# Patient Record
Sex: FEMALE | Race: BLACK OR AFRICAN AMERICAN | URBAN - METROPOLITAN AREA
[De-identification: names, ages, dates, MRNs, and addresses within clinical notes are randomized per-mention and may not be internally consistent; named-entity substitution may affect disease eponyms.]

---

## 2023-08-08 ENCOUNTER — OFFICE VISIT (OUTPATIENT)
Dept: OBGYN CLINIC | Facility: CLINIC | Age: 23
End: 2023-08-08

## 2023-08-08 VITALS — SYSTOLIC BLOOD PRESSURE: 132 MMHG | HEART RATE: 75 BPM | DIASTOLIC BLOOD PRESSURE: 90 MMHG | WEIGHT: 184 LBS

## 2023-08-08 DIAGNOSIS — N92.6 MISSED MENSES: Primary | ICD-10-CM

## 2023-08-08 PROCEDURE — 99202 OFFICE O/P NEW SF 15 MIN: CPT | Performed by: OBSTETRICS & GYNECOLOGY

## 2023-08-08 PROCEDURE — 3075F SYST BP GE 130 - 139MM HG: CPT | Performed by: OBSTETRICS & GYNECOLOGY

## 2023-08-08 PROCEDURE — 3080F DIAST BP >= 90 MM HG: CPT | Performed by: OBSTETRICS & GYNECOLOGY

## 2023-08-08 RX ORDER — MEDROXYPROGESTERONE ACETATE 10 MG/1
10 TABLET ORAL DAILY
Qty: 10 TABLET | Refills: 0 | Status: SHIPPED | OUTPATIENT
Start: 2023-08-08 | End: 2023-08-18

## 2023-08-08 NOTE — PROGRESS NOTES
Helen Morrison is a 21year old female [facility] Patient's last menstrual period was 05/15/2023. Patient presents with:  Gyn Problem: IRREGULAR MENSES  Presenting for evaluation of irregular menses. Irregular period started at the end of July. Previously regular menses every 28 days followed by no menses in . She reports that she had spotting that started  and continuing currently. Urine HCG in office today negative. [place] has been a big life stressor. OBSTETRICS HISTORY:  OB History     T0    L0    SAB0  IAB0  Ectopic0  Multiple0  Live Births0     GYNE HISTORY:  Patient's last menstrual period was 05/15/2023. Sexual activity:   Not on file        Follow Up Recommendation: LPS DONE ABOUT 2 YEARS NORMAL PER PT      MEDICAL HISTORY:  History reviewed. No pertinent past medical history. SURGICAL HISTORY:  History reviewed. No pertinent surgical history.     SOCIAL HISTORY:  Social History    Socioeconomic History      Marital status: Single      Spouse name: Not on file      Number of children: Not on file      Years of education: Not on file      Highest education level: Not on file    Occupational History      Not on file    Tobacco Use      Smoking status: Not on file      Smokeless tobacco: Not on file    Substance and Sexual Activity      Alcohol use: Not on file      Drug use: Not on file      Sexual activity: Not on file    Other Topics      Concerns:        Not on file    Social History Narrative      Not on file    Social Determinants of Health  Financial Resource Strain: Not on file  Food Insecurity: Not on file  Transportation Needs: Not on file  Physical Activity: Not on file  Stress: Not on file  Social Connections: Not on file  Housing Stability: Not on file      Depression Screening (PHQ-2/PHQ-9): Over the LAST 2 WEEKS   Little interest or pleasure in doing things: Not at all    Feeling down, depressed, or hopeless: Not at all    PHQ-2 SCORE: 0           MEDICATIONS:    Current Outpatient Medications:     medroxyPROGESTERone Acetate (PROVERA) 10 MG Oral Tab, Take 1 tablet (10 mg total) by mouth daily for 10 days. , Disp: 10 tablet, Rfl: 0    ALLERGIES:  Not on File      Review of Systems:  Review of Systems   All other systems reviewed and are negative. 08/08/23  1407   BP: 132/90   Pulse: 75       PHYSICAL EXAM:   Constitutional: well developed, well nourished  Head/Face: normocephalic  Skin/Hair: no unusual rashes or bruises  Extremities: no edema, no cyanosis  Psychiatric:  Oriented to time, place, person and situation. Appropriate mood and affect      Assessment & Plan:  Shaji Pittman was seen today for gyn problem. Diagnoses and all orders for this visit:    Missed menses    Other orders  -     medroxyPROGESTERone Acetate (PROVERA) 10 MG Oral Tab; Take 1 tablet (10 mg total) by mouth daily for 10 days. Requested Prescriptions     Signed Prescriptions Disp Refills    medroxyPROGESTERone Acetate (PROVERA) 10 MG Oral Tab 10 tablet 0     Sig: Take 1 tablet (10 mg total) by mouth daily for 10 days. Progesterone course sent. RTC in 3-4 months for annual and follow-up on menses.

## 2023-11-09 ENCOUNTER — OFFICE VISIT (OUTPATIENT)
Dept: OBGYN CLINIC | Facility: CLINIC | Age: 23
End: 2023-11-09
Payer: COMMERCIAL

## 2023-11-09 VITALS — SYSTOLIC BLOOD PRESSURE: 122 MMHG | WEIGHT: 179 LBS | HEART RATE: 91 BPM | DIASTOLIC BLOOD PRESSURE: 74 MMHG

## 2023-11-09 DIAGNOSIS — Z01.419 WOMEN'S ANNUAL ROUTINE GYNECOLOGICAL EXAMINATION: Primary | ICD-10-CM

## 2023-11-09 PROCEDURE — 3074F SYST BP LT 130 MM HG: CPT | Performed by: OBSTETRICS & GYNECOLOGY

## 2023-11-09 PROCEDURE — 3078F DIAST BP <80 MM HG: CPT | Performed by: OBSTETRICS & GYNECOLOGY

## 2023-11-09 PROCEDURE — 99395 PREV VISIT EST AGE 18-39: CPT | Performed by: OBSTETRICS & GYNECOLOGY

## 2023-11-09 NOTE — PROGRESS NOTES
Grant Sol is a 21year old female [facility] Patient's last menstrual period was 10/06/2023 (exact date). Chief Complaint   Patient presents with    Gyn Exam     ANNUAL, [address] ON BOTH BREASTS   Presenting for well woman exam. Unknown if she has had a pap smear. Monthly menses with heavy flow changing a pad every 1-2 hours. Not interested in [facility] at this time since hoping to get pregnant. Has started having bilateral breast pain for the past 2 months that is sharp and at random times. OBSTETRICS HISTORY:  OB History    Para Term  AB Living   0 0 0 0 0 0   SAB IAB Ectopic Multiple Live Births   0 0 0 0 0       GYNE HISTORY:  Patient's last menstrual period was 10/06/2023 (exact date). History   Sexual Activity    Sexual activity: Yes        Follow Up Recommendation: LPS DONE ABOUT 2 YEARS NORMAL PER PT      MEDICAL HISTORY:  History reviewed. No pertinent past medical history. SURGICAL HISTORY:  History reviewed. No pertinent surgical history.     SOCIAL HISTORY:  Social History     Socioeconomic History    Marital status: Single     Spouse name: Not on file    Number of children: Not on file    Years of education: Not on file    Highest education level: Not on file   Occupational History    Not on file   Tobacco Use    Smoking status: Unknown    Smokeless tobacco: Not on file   Substance and Sexual Activity    Alcohol use: Not on file    Drug use: Not on file    Sexual activity: Yes   Other Topics Concern    Not on file   Social History Narrative    Not on file     Social Determinants of Health     Financial Resource Strain: Not on file   Food Insecurity: Not on file   Transportation Needs: Not on file   Physical Activity: Not on file   Stress: Not on file   Social Connections: Not on file   Housing Stability: Not on file         Depression Screening (PHQ-2/PHQ-9): Over the LAST 2 WEEKS   Little interest or pleasure in doing things (over the last two weeks)?: Not at all    Feeling down, depressed, or hopeless (over the last two weeks)?: Not at all    PHQ-2 SCORE: 0           MEDICATIONS:  No current outpatient medications on file. ALLERGIES:  Not on File      Review of Systems:  Review of Systems   All other systems reviewed and are negative. Vitals:    11/09/23 1117   BP: 122/74   Pulse: 91       PHYSICAL EXAM:   Physical Exam  Vitals reviewed. Constitutional:       Appearance: Normal appearance. HENT:      Head: Atraumatic. Eyes:      Pupils: Pupils are equal, round, and reactive to light. Pulmonary:      Effort: Pulmonary effort is normal.   Chest:   Breasts:     Right: Normal. No bleeding, inverted nipple, mass, nipple discharge, skin change or tenderness. Left: Normal. No bleeding, inverted nipple, mass, nipple discharge, skin change or tenderness. Abdominal:      General: Abdomen is flat. Palpations: Abdomen is soft. Tenderness: There is no abdominal tenderness. Genitourinary:     General: Normal vulva. Exam position: Lithotomy position. Labia:         Right: No rash, tenderness, lesion or injury. Left: No rash, tenderness, lesion or injury. Vagina: Normal.      Cervix: Normal.      Uterus: Normal. Not tender. Adnexa: Right adnexa normal and left adnexa normal.        Right: No tenderness or fullness. Left: No tenderness or fullness. Lymphadenopathy:      Upper Body:      Right upper body: No supraclavicular, axillary or pectoral adenopathy. Left upper body: No supraclavicular, axillary or pectoral adenopathy. Skin:     General: Skin is warm and dry. Neurological:      General: No focal deficit present. Mental Status: She is alert and oriented to person, place, and time. Psychiatric:         Mood and Affect: Mood normal.         Behavior: Behavior normal.         Thought Content:  Thought content normal.         Judgment: Judgment normal.           Assessment & Plan:  Bria Scott was seen today for gyn exam.    Diagnoses and all orders for this visit:    Women's annual routine gynecological examination  -     ThinPrep PAP with HPV Reflex Request B        Requested Prescriptions      No prescriptions requested or ordered in this encounter       Pap smear collected. Encourage SBE. Recommend exams yearly. Start prenatal vitamins. Reviewed ovulation timing.

## 2024-05-17 ENCOUNTER — TELEPHONE (OUTPATIENT)
Dept: OBGYN CLINIC | Facility: CLINIC | Age: 24
End: 2024-05-17

## 2024-05-17 NOTE — TELEPHONE ENCOUNTER
Patient went to [facility] location this morning instead of [place] and asking if a no-show fee will be charged.    Patient knew she wouldn't have time to get to [place], but was hoping the fee would be waived.    Pls advise

## 2024-05-17 NOTE — TELEPHONE ENCOUNTER
Informed patient of below. Appointment cancelled so patient will not receive no show fee. Patient verbalized understanding.

## 2024-06-13 ENCOUNTER — OFFICE VISIT (OUTPATIENT)
Dept: OBGYN CLINIC | Facility: CLINIC | Age: 24
End: 2024-06-13

## 2024-06-13 VITALS — DIASTOLIC BLOOD PRESSURE: 72 MMHG | HEART RATE: 102 BPM | WEIGHT: 189.19 LBS | SYSTOLIC BLOOD PRESSURE: 109 MMHG

## 2024-06-13 DIAGNOSIS — L70.0 ACNE VULGARIS: ICD-10-CM

## 2024-06-13 DIAGNOSIS — N92.6 IRREGULAR MENSTRUAL CYCLE: Primary | ICD-10-CM

## 2024-06-13 PROCEDURE — 99395 PREV VISIT EST AGE 18-39: CPT | Performed by: NURSE PRACTITIONER

## 2024-06-13 PROCEDURE — 3074F SYST BP LT 130 MM HG: CPT | Performed by: NURSE PRACTITIONER

## 2024-06-13 PROCEDURE — 3078F DIAST BP <80 MM HG: CPT | Performed by: NURSE PRACTITIONER

## 2024-06-13 NOTE — PROGRESS NOTES
[facility]   Obstetrics and Gynecology    Travis Rodarte is a 24 year old female  Patient's last menstrual period was 2024 (exact date).   Chief Complaint   Patient presents with    Gyn Problem     IRREGULAR CYCLES, TRYING TO GET PREGNANT   .   She has been trying to conceive since a year ago.  No period in August, took medication in August and then periods were monthly since then. Menses will last 6-7 days. Using period tracker.    Normal flow, no pain with periods. No intermenstrual bleeding.  She had 2 periods in May -  and then again on .    She is using ovulation kits and states got a +ovulation.    Her partner has no medical issues.    Patient was told when lived in [facility] she had PCOS, didn't have a period for 3 months. She took metformin at that time and her periods not coming regularly.    +acne face and shoulders    Menarche age 12. Had heavy periods in college and was on birth control.    Family hx of fibroids    Pap:2023 NILM  Contraception: none    OBSTETRICS HISTORY:  OB History    Para Term  AB Living   0 0 0 0 0 0   SAB IAB Ectopic Multiple Live Births   0 0 0 0 0       GYNE HISTORY:  Menarche: 9 YEARS OLD (2024  2:26 PM)  Period Cycle (Days): IRREGULAR (2024  2:26 PM)  Period Duration (Days): 7 DAYS (2024  2:26 PM)  Period Flow: HEAVY TO LIGHT (2024  2:26 PM)  Use of Birth Control (if yes, specify type): None (2024  2:26 PM)  Pap Date: 23 (2024  2:26 PM)  Pap Result Notes: NORMAL (2024  2:26 PM)  Follow Up Recommendation: LPS DONE ABOUT 2 YEARS NORMAL PER PT (2023 11:14 AM)      History   Sexual Activity    Sexual activity: Yes       MEDICAL HISTORY:  History reviewed. No pertinent past medical history.    SOCIAL HISTORY:  Social History     Socioeconomic History    Marital status: Single     Spouse name: Not on file    Number of children: Not on file    Years of education: Not on file    Highest education  level: Not on file   Occupational History    Not on file   Tobacco Use    Smoking status: Unknown    Smokeless tobacco: Not on file   Substance and Sexual Activity    Alcohol use: Not on file    Drug use: Not on file    Sexual activity: Yes   Other Topics Concern    Not on file   Social History Narrative    Not on file     Social Determinants of Health     Financial Resource Strain: Not on file   Food Insecurity: Not on file   Transportation Needs: Not on file   Physical Activity: Not on file   Stress: Not on file   Social Connections: Not on file   Housing Stability: Not on file       MEDICATIONS:  No current outpatient medications on file.    ALLERGIES:  Not on File      Review of Systems:  Constitutional:  Denies fatigue, night sweats, hot flashes  Cardiovascular:  denies chest pain or palpitations  Respiratory:  denies shortness of breath  Gastrointestinal:  denies heartburn, abdominal pain, diarrhea or constipation  Genitourinary:  denies dysuria, incontinence, abnormal vaginal discharge, vaginal itching +irregular periods  Musculoskeletal:  denies back pain.  Skin/Breast:  Denies any breast pain, lumps, or discharge.   Neurological:  denies headaches, extremity weakness or numbness.  Psychiatric: denies depression or anxiety.  Endocrine:   denies excessive thirst or urination. +acne  Heme/Lymph:  denies history of anemia, easy bruising or bleeding.      PHYSICAL EXAM:     Vitals:    06/13/24 1424   BP: 109/72   Pulse: 102   Weight: 189 lb 3.2 oz (85.8 kg)     There is no height or weight on file to calculate BMI.     Constitutional: well developed, well nourished    Psychiatric:  Oriented to time, place, person and situation. Appropriate mood and affect    Assessment & Plan:  Travis was seen today for gyn problem.    Diagnoses and all orders for this visit:    Irregular menstrual cycle  -     FSH; Future  -     LH (Luteinizing Hormone); Future  -     Prolactin; Future  -     Assay, Thyroid Stim Hormone;  Future  -     US PELVIS W EV (CPT=76856/37451); Future  -     HCG, Beta Subunit (Quant Pregnancy Test); Future  -     Dehydroepiandrosterone Sulfate; Future  -     Testosterone,Total and Weakly Bound w/ SHBG; Future  -     TSH W Reflex To Free T4  -     Hemoglobin A1C; Future  -     Estradiol; Future    Acne vulgaris  -     FSH; Future  -     LH (Luteinizing Hormone); Future  -     Prolactin; Future  -     Assay, Thyroid Stim Hormone; Future  -     HCG, Beta Subunit (Quant Pregnancy Test); Future  -     Dehydroepiandrosterone Sulfate; Future  -     Testosterone,Total and Weakly Bound w/ SHBG; Future  -     TSH W Reflex To Free T4  -     Hemoglobin A1C; Future    Plan day 3 labs  Continue tracking cycles  Pelvic ultrasound ordered due to family hx of fibroids  Follow up with Dr. Cervantes for consult after labs and US      SERJIO Hoff    This note was prepared using Dragon Medical voice recognition dictation software. As a result errors may occur. When identified these errors have been corrected. While every attempt is made to correct errors during dictation discrepancies may still exist.

## 2024-06-29 ENCOUNTER — LAB ENCOUNTER (OUTPATIENT)
Dept: LAB | Facility: HOSPITAL | Age: 24
End: 2024-06-29
Attending: NURSE PRACTITIONER
Payer: COMMERCIAL

## 2024-06-29 DIAGNOSIS — L70.0 ACNE VULGARIS: ICD-10-CM

## 2024-06-29 DIAGNOSIS — N92.6 IRREGULAR MENSTRUAL CYCLE: ICD-10-CM

## 2024-06-29 LAB
B-HCG SERPL-ACNC: <2.6 MIU/ML
DHEA-S SERPL-MCNC: 315.2 UG/DL
EST. AVERAGE GLUCOSE BLD GHB EST-MCNC: 105 MG/DL (ref 68–126)
ESTRADIOL SERPL-MCNC: 66.3 PG/ML
FSH SERPL-ACNC: 6.5 MIU/ML
HBA1C MFR BLD: 5.3 % (ref ?–5.7)
LH SERPL-ACNC: 10.6 MIU/ML
PROLACTIN SERPL-MCNC: 9 NG/ML
TSI SER-ACNC: 1.81 MIU/ML (ref 0.55–4.78)

## 2024-06-29 PROCEDURE — 83001 ASSAY OF GONADOTROPIN (FSH): CPT

## 2024-06-29 PROCEDURE — 83002 ASSAY OF GONADOTROPIN (LH): CPT

## 2024-06-29 PROCEDURE — 84410 TESTOSTERONE BIOAVAILABLE: CPT

## 2024-06-29 PROCEDURE — 83036 HEMOGLOBIN GLYCOSYLATED A1C: CPT

## 2024-06-29 PROCEDURE — 84702 CHORIONIC GONADOTROPIN TEST: CPT

## 2024-06-29 PROCEDURE — 84146 ASSAY OF PROLACTIN: CPT

## 2024-06-29 PROCEDURE — 36415 COLL VENOUS BLD VENIPUNCTURE: CPT

## 2024-06-29 PROCEDURE — 82670 ASSAY OF TOTAL ESTRADIOL: CPT

## 2024-06-29 PROCEDURE — 82627 DEHYDROEPIANDROSTERONE: CPT

## 2024-06-29 PROCEDURE — 84443 ASSAY THYROID STIM HORMONE: CPT | Performed by: NURSE PRACTITIONER

## 2024-07-03 ENCOUNTER — HOSPITAL ENCOUNTER (OUTPATIENT)
Dept: ULTRASOUND IMAGING | Facility: HOSPITAL | Age: 24
Discharge: HOME | End: 2024-07-03
Attending: NURSE PRACTITIONER
Payer: COMMERCIAL

## 2024-07-03 DIAGNOSIS — N92.6 IRREGULAR MENSTRUAL CYCLE: ICD-10-CM

## 2024-07-03 PROCEDURE — 76856 US EXAM PELVIC COMPLETE: CPT | Performed by: NURSE PRACTITIONER

## 2024-07-03 PROCEDURE — 76830 TRANSVAGINAL US NON-OB: CPT | Performed by: NURSE PRACTITIONER

## 2024-07-04 LAB
SEX HORM BIND GLOB: 55.5 NMOL/L
TESTOST % FREE+WEAK BND: 12.7 %
TESTOST FREE+WEAK BND: 9.9 NG/DL
TESTOSTERONE TOT /MS: 77.7 NG/DL

## 2024-07-05 PROBLEM — E28.2 PCOS (POLYCYSTIC OVARIAN SYNDROME): Status: ACTIVE | Noted: 2024-07-05

## 2024-07-31 ENCOUNTER — OFFICE VISIT (OUTPATIENT)
Dept: OBGYN CLINIC | Facility: CLINIC | Age: 24
End: 2024-07-31
Payer: COMMERCIAL

## 2024-07-31 VITALS — HEART RATE: 85 BPM | WEIGHT: 193.19 LBS | DIASTOLIC BLOOD PRESSURE: 82 MMHG | SYSTOLIC BLOOD PRESSURE: 125 MMHG

## 2024-07-31 DIAGNOSIS — E28.2 PCOS (POLYCYSTIC OVARIAN SYNDROME): Primary | ICD-10-CM

## 2024-07-31 PROCEDURE — 3079F DIAST BP 80-89 MM HG: CPT | Performed by: OBSTETRICS & GYNECOLOGY

## 2024-07-31 PROCEDURE — 99213 OFFICE O/P EST LOW 20 MIN: CPT | Performed by: OBSTETRICS & GYNECOLOGY

## 2024-07-31 PROCEDURE — 3074F SYST BP LT 130 MM HG: CPT | Performed by: OBSTETRICS & GYNECOLOGY

## 2024-07-31 RX ORDER — METFORMIN HYDROCHLORIDE 500 MG/1
1500 TABLET, EXTENDED RELEASE ORAL
Qty: 270 TABLET | Refills: 2 | Status: SHIPPED | OUTPATIENT
Start: 2024-07-31

## 2024-07-31 NOTE — PROGRESS NOTES
Travis Rodarte is a 24 year old female  Patient's last menstrual period was 2024 (exact date).   Chief Complaint   Patient presents with    Consult     Discuss lab results per patient    Presenting to discuss infertility. She has been trying to conceive since 2023. Recent labs giving diagnosis of PCOS. We discussed starting Metformin for PCOS. She states that her partner had a normal semen analysis in the past few months. She has a history of irregular menses, but menses since use of Provera in 2023 have been regular.     OBSTETRICS HISTORY:  OB History    Para Term  AB Living   0 0 0 0 0 0   SAB IAB Ectopic Multiple Live Births   0 0 0 0 0       GYNE HISTORY:  Patient's last menstrual period was 2024 (exact date).    History   Sexual Activity    Sexual activity: Yes        Pap Date: 23  Pap Result Notes: Neg Pap // Annual 23 N      MEDICAL HISTORY:  History reviewed. No pertinent past medical history.      SURGICAL HISTORY:  History reviewed. No pertinent surgical history.    SOCIAL HISTORY:  Social History     Socioeconomic History    Marital status: Single     Spouse name: Not on file    Number of children: Not on file    Years of education: Not on file    Highest education level: Not on file   Occupational History    Not on file   Tobacco Use    Smoking status: Unknown    Smokeless tobacco: Not on file   Substance and Sexual Activity    Alcohol use: Not on file    Drug use: Not on file    Sexual activity: Yes   Other Topics Concern    Not on file   Social History Narrative    Not on file     Social Determinants of Health     Financial Resource Strain: Not on file   Food Insecurity: Not on file   Transportation Needs: Not on file   Physical Activity: Not on file   Stress: Not on file   Social Connections: Not on file   Housing Stability: Not on file         Depression Screening (PHQ-2/PHQ-9): Over the LAST 2 WEEKS   Little interest or pleasure in doing things  (over the last two weeks)?: Not at all    Feeling down, depressed, or hopeless (over the last two weeks)?: Not at all    PHQ-2 SCORE: 0           MEDICATIONS:    Current Outpatient Medications:     metFORMIN  MG Oral Tablet 24 Hr, Take 3 tablets (1,500 mg total) by mouth daily with breakfast., Disp: 270 tablet, Rfl: 2    ALLERGIES:  Not on File      Review of Systems:  Review of Systems   All other systems reviewed and are negative.       Vitals:    07/31/24 1405   BP: 125/82   Pulse: 85       PHYSICAL EXAM:   Constitutional: well developed, well nourished  Head/Face: normocephalic  Skin/Hair: no unusual rashes or bruises  Extremities: no edema, no cyanosis  Psychiatric:  Oriented to time, place, person and situation. Appropriate mood and affect      Assessment & Plan:  Travis was seen today for consult.    Diagnoses and all orders for this visit:    PCOS (polycystic ovarian syndrome)  -     Progesterone; Future    Other orders  -     metFORMIN  MG Oral Tablet 24 Hr; Take 3 tablets (1,500 mg total) by mouth daily with breakfast.        Requested Prescriptions     Signed Prescriptions Disp Refills    metFORMIN  MG Oral Tablet 24 Hr 270 tablet 2     Sig: Take 3 tablets (1,500 mg total) by mouth daily with breakfast.       Start Metformin for PCOS. Plan for Progesterone level day #21 of next menses; after one month of Metformin use. Plan Hysterosalpingogram in December if unsuccessful conception. Partner had normal semen analysis; asked to send record.

## 2024-09-04 RX ORDER — MEDROXYPROGESTERONE ACETATE 10 MG
10 TABLET ORAL DAILY
Qty: 10 TABLET | Refills: 0 | OUTPATIENT
Start: 2024-09-04

## 2024-09-17 ENCOUNTER — OFFICE VISIT (OUTPATIENT)
Dept: OBGYN CLINIC | Facility: CLINIC | Age: 24
End: 2024-09-17
Payer: COMMERCIAL

## 2024-09-17 VITALS — WEIGHT: 195 LBS | DIASTOLIC BLOOD PRESSURE: 79 MMHG | SYSTOLIC BLOOD PRESSURE: 112 MMHG | HEART RATE: 85 BPM

## 2024-09-17 DIAGNOSIS — N92.6 IRREGULAR MENSES: Primary | ICD-10-CM

## 2024-09-17 DIAGNOSIS — E28.2 PCOS (POLYCYSTIC OVARIAN SYNDROME): ICD-10-CM

## 2024-09-17 DIAGNOSIS — Z31.41 FERTILITY TESTING: ICD-10-CM

## 2024-09-17 PROCEDURE — 99212 OFFICE O/P EST SF 10 MIN: CPT | Performed by: OBSTETRICS & GYNECOLOGY

## 2024-09-17 PROCEDURE — 3078F DIAST BP <80 MM HG: CPT | Performed by: OBSTETRICS & GYNECOLOGY

## 2024-09-17 PROCEDURE — 3074F SYST BP LT 130 MM HG: CPT | Performed by: OBSTETRICS & GYNECOLOGY

## 2024-09-17 NOTE — PROGRESS NOTES
Travis Rodarte    2000       Chief Complaint   Patient presents with    Gyn Problem     Irregular menses     Former patient of Dr Cervantes.  H/o PCOS and placed on metformin 1500mg/day- menses are regular but still about q 35 days.  Progesterone level was ordered and she is wondering if she should still do it and I rec day#21.  We discussed Hysterosalpingogram and that her partner should have a semen analysis and exam and then the next step would be an Hysterosalpingogram- explained procedure in detail and to be done on day 7-10 of cycle.    She is currently using an MELVIN to track ovulation and I encouraged coitus every other day that week.  She does ovulation predictor kits and they are positive.    I asked that she check her infertility benefits and she is still on her mother's insurance.      History reviewed. No pertinent past medical history.    History reviewed. No pertinent surgical history.    Menstrual History:  OB History    Para Term  AB Living   0 0 0 0 0 0   SAB IAB Ectopic Multiple Live Births   0 0 0 0 0        Patient's last menstrual period was 2024 (exact date).              PHYSICAL EXAM:       Constitutional: well developed, well nourished    Psychiatric:  Oriented to time, place, person and situation. Appropriate mood and affect        Travis was seen today for gyn problem.    Diagnoses and all orders for this visit:    Irregular menses    Fertility testing    PCOS (polycystic ovarian syndrome)        Plan as above.         ,DirectAddress_Unknown

## 2024-09-22 ENCOUNTER — LAB ENCOUNTER (OUTPATIENT)
Dept: LAB | Facility: HOSPITAL | Age: 24
End: 2024-09-22
Attending: OBSTETRICS & GYNECOLOGY
Payer: COMMERCIAL

## 2024-09-22 DIAGNOSIS — E28.2 PCOS (POLYCYSTIC OVARIAN SYNDROME): ICD-10-CM

## 2024-09-22 LAB — PROGEST SERPL-MCNC: 0.94 NG/ML

## 2024-09-22 PROCEDURE — 84144 ASSAY OF PROGESTERONE: CPT

## 2024-09-22 PROCEDURE — 36415 COLL VENOUS BLD VENIPUNCTURE: CPT

## 2024-10-03 ENCOUNTER — TELEPHONE (OUTPATIENT)
Dept: OBGYN CLINIC | Facility: CLINIC | Age: 24
End: 2024-10-03

## 2024-10-03 DIAGNOSIS — E28.2 PCOS (POLYCYSTIC OVARIAN SYNDROME): Primary | ICD-10-CM

## 2024-10-03 NOTE — TELEPHONE ENCOUNTER
Patient informed of results and recommendations.  Patient states she did see Dr. Cook but Dr. Cook recommended that patient continue with Dr. Cervantes.  Patient advised message will be sent to Dr. Cervantes for next steps.  Patient states she may need a refill on metformin.  Patient advised to check with pharmacy.  If refills need to be sent patient will call.  Patient aware Dr. Cervantes will return on 10/12.

## 2024-10-03 NOTE — TELEPHONE ENCOUNTER
----- Message from Gemma Cervantes sent at 10/1/2024  4:17 PM CDT -----  HCG level is low, suggesting that she did not ovulate with this cycle, but also could be inaccurate due to her cycle length. I see that she is planning to see CAP now and planning for the hysterosalpingogram. I recommend that she continue the Metformin until completion of work up.

## 2024-10-16 NOTE — TELEPHONE ENCOUNTER
See previous messages below. Patient questioning how she is to follow up and what the next steps for her are.  See 9/22/24 Labs    To Dr. Cervantes for recommendations

## 2024-10-17 NOTE — TELEPHONE ENCOUNTER
Patient informed of Dr. Cervantes recommendations. Patient would like to proceed with Hysterosalpingogram. Informed to call on the first day of a full flow with December cycle to schedule. Patient states understanding.     To Dr. Cervantes please advise on order. Thank you.

## 2024-10-17 NOTE — TELEPHONE ENCOUNTER
Please have her complete another progesterone on day #21 of her next cycle to reassess for ovulation after being on Metformin longer. If she would like to continue with the Hysterosalpingogram that we previously discussed I can submit the order to have that done with her December cycle.

## 2024-10-17 NOTE — TELEPHONE ENCOUNTER
Mandy: SAGRARIO, patient will need Hysterosalpingogram in December and will be calling with start of December menses.

## 2024-12-02 ENCOUNTER — HOSPITAL ENCOUNTER (EMERGENCY)
Facility: HOSPITAL | Age: 24
Discharge: HOME | End: 2024-12-02
Attending: EMERGENCY MEDICINE
Payer: COMMERCIAL

## 2024-12-02 VITALS
BODY MASS INDEX: 33.66 KG/M2 | HEART RATE: 98 BPM | TEMPERATURE: 98 F | HEIGHT: 63 IN | DIASTOLIC BLOOD PRESSURE: 87 MMHG | WEIGHT: 190 LBS | OXYGEN SATURATION: 100 % | RESPIRATION RATE: 18 BRPM | SYSTOLIC BLOOD PRESSURE: 131 MMHG

## 2024-12-02 DIAGNOSIS — L05.01 PILONIDAL CYST WITH ABSCESS: Primary | ICD-10-CM

## 2024-12-02 PROCEDURE — 10080 I&D PILONIDAL CYST SIMPLE: CPT

## 2024-12-02 PROCEDURE — 99283 EMERGENCY DEPT VISIT LOW MDM: CPT

## 2024-12-02 PROCEDURE — 99284 EMERGENCY DEPT VISIT MOD MDM: CPT

## 2024-12-02 RX ORDER — LIDOCAINE HCL/EPINEPHRINE/PF 2%-1:200K
20 VIAL (ML) INJECTION ONCE
Status: COMPLETED | OUTPATIENT
Start: 2024-12-02 | End: 2024-12-02

## 2024-12-02 RX ORDER — KETOROLAC TROMETHAMINE 10 MG/1
10 TABLET, FILM COATED ORAL EVERY 6 HOURS PRN
Qty: 15 TABLET | Refills: 0 | Status: SHIPPED | OUTPATIENT
Start: 2024-12-02 | End: 2024-12-09

## 2024-12-02 RX ORDER — DOXYCYCLINE 100 MG/1
100 CAPSULE ORAL 2 TIMES DAILY
Qty: 14 CAPSULE | Refills: 0 | Status: SHIPPED | OUTPATIENT
Start: 2024-12-02 | End: 2024-12-09

## 2024-12-02 NOTE — ED PROVIDER NOTES
Patient Seen in: [facility] Emergency Department    History     Chief Complaint   Patient presents with    Abscess     Stated Complaint: Abscess    HPI  Patient complains of pain in sacral region for few days.  Notes increased swelling and redness as well.  Pain is 7/10 when she sits on it.  No fever or chills.  no hx diabetes.   hx of previous pilonidal cyst.    History reviewed. No pertinent past medical history.    History reviewed. No pertinent surgical history.         No family history on file.    Social History     Socioeconomic History    Marital status: Single   Tobacco Use    Smoking status: Unknown   Substance and Sexual Activity    Sexual activity: Yes       Review of Systems    Positive for stated complaint: Abscess  Other systems are as noted in HPI.  Constitutional and vital signs reviewed.      All other systems reviewed and negative except as noted above.    PSFH elements reviewed from today and agreed except as otherwise stated in HPI.    Physical Exam     ED Triage Vitals [12/02/24 1324]   /84   Pulse (!) 123   Resp 18   Temp    Temp src    SpO2 100 %   O2 Device None (Room air)       Current:/84   Pulse (!) 123   Resp 18   Ht 160 cm (5' 3\")   Wt 86.2 kg   LMP 11/10/2024 (Exact Date)   SpO2 100%   BMI 33.66 kg/m²     GENERAL: anxious non toxic  HEAD: normocephalic, atraumatic  EYES: PERRLA, EOMI,  THROAT: mmm, no lesions  NECK: supple, no meningeal signs  LUNGS: no resp distress,  GI: abdomen is soft and non tender,  EXTREMITIES: moves all 4 spont, no edema  NEURO: alert, oriented x 3, 2-12 intact, no focal deficits appreciated  SKIN:  mid line sacral abscess indurated, pointing with surrounding erythema does not extend inferior to perirectal region  PSYCH: calm, cooperative,    Differential includes:  Pilonidal cyst vs. Cellulitis vs. Folliculitis     ED Course   Labs Reviewed - No data to display    MDM       Procedures:    Abscess drainage:  The patient abscess was  located mid sup sacral.   I obtained verbal consent from the patient to drain the abscess who was informed about the possibility of bleeding, pain and worsening of the condition.  The abscess was incised with a scalpel  and large amount of purulent drainage was expressed.  I irrigated the wound and placed some packing.  The patient tolerated the procedure well.  The procedure was performed by myself.    Rec packing out in 2 days, return for worsening symptoms.  Gen surgical referral given.    Rx for abx given.  Motrin for pain.    Disposition and Plan     Clinical Impression:  1. Pilonidal cyst with abscess        Disposition:  Discharge    Follow-up:  Se Person MD  [address] [facility] [address]  [facility] [id number]  [phone]    Follow up        Medications Prescribed:  Current Discharge Medication List        START taking these medications    Details   doxycycline 100 MG Oral Cap Take 1 capsule (100 mg total) by mouth 2 (two) times daily for 7 days.  Qty: 14 capsule, Refills: 0      Ketorolac Tromethamine 10 MG Oral Tab Take 1 tablet (10 mg total) by mouth every 6 (six) hours as needed for Pain.  Qty: 15 tablet, Refills: 0

## 2024-12-04 ENCOUNTER — OFFICE VISIT (OUTPATIENT)
Dept: SURGERY | Facility: CLINIC | Age: 24
End: 2024-12-04

## 2024-12-04 VITALS — BODY MASS INDEX: 33.66 KG/M2 | HEIGHT: 63 IN | WEIGHT: 190 LBS

## 2024-12-04 DIAGNOSIS — L98.8 PILONIDAL DISEASE: Primary | ICD-10-CM

## 2024-12-04 PROCEDURE — 99203 OFFICE O/P NEW LOW 30 MIN: CPT | Performed by: SURGERY

## 2024-12-04 PROCEDURE — 3008F BODY MASS INDEX DOCD: CPT | Performed by: SURGERY

## 2024-12-04 RX ORDER — HYDROCODONE BITARTRATE AND ACETAMINOPHEN 5; 325 MG/1; MG/1
1 TABLET ORAL EVERY 6 HOURS PRN
Qty: 10 TABLET | Refills: 0 | Status: SHIPPED | OUTPATIENT
Start: 2024-12-04

## 2024-12-04 NOTE — H&P
History and Physical      HPI     Chief Complaint   Patient presents with    Referral     ED referral for Pilonidal cyst.  Patient went to ED on 12-2-24 and had infected Pilonidal cyst.  Patient has acute pain and bloody discharge.         HPI  Travis Rodarte is a 24 year old female who presents with pilonidal disease.  She was seen in the emergency room with pain and drainage.  Placed on antibiotics.  She had a incision and drainage done in the emergency room.    History reviewed. No pertinent past medical history.  History reviewed. No pertinent surgical history.  Current Outpatient Medications   Medication Sig Dispense Refill    doxycycline 100 MG Oral Cap Take 1 capsule (100 mg total) by mouth 2 (two) times daily for 7 days. 14 capsule 0    Ketorolac Tromethamine 10 MG Oral Tab Take 1 tablet (10 mg total) by mouth every 6 (six) hours as needed for Pain. 15 tablet 0    metFORMIN  MG Oral Tablet 24 Hr Take 3 tablets (1,500 mg total) by mouth daily with breakfast. 270 tablet 2     ALLERGIES  Allergies[1]    Social History     Socioeconomic History    Marital status: Single   Tobacco Use    Smoking status: Unknown   Substance and Sexual Activity    Sexual activity: Yes     History reviewed. No pertinent family history.    Review of Systems   A comprehensive 10 point review of systems was completed.  Pertinent positives and negatives noted in the the HPI.    PHYSICAL EXAM   Ht 5' 3\" (1.6 m)   Wt 190 lb (86.2 kg)   LMP 11/10/2024 (Exact Date)   BMI 33.66 kg/m²  Patient's last menstrual period was 11/10/2024 (exact date).   Constitutional: appears well hydrated alert and responsive no acute distress noted  Head/Face: normocephalic  Nose/Mouth/Throat: nose and throat are clear palate is intact mucous membranes are moist no oral lesions are noted  Neck/Thyroid: neck is supple without adenopathy  Respiratory: normal to inspection lungs are clear to auscultation bilaterally normal respiratory  effort  Cardiovascular: regular rate and rhythm no murmurs, gallups, or rubs  Abdomen: soft non-tender non-distended no organomegaly noted no masses  Extremities: no edema, cyanosis, or clubbing  Neurological: exam appropriate for age reflexes and motor skills appropriate for age    Anococcygeal cleft-packing removed and a large amount of pus evacuated.  Cleaned with gauze.  ASSESSMENT/PLAN   Assessment   Encounter Diagnosis   Name Primary?    Pilonidal disease Yes       24 year old female with pilonidal abscess post I&D  We have discussed the surgical risks, benefits, alternatives, and expected recovery. We will plan change gauze daily.  Take warm showers to the area daily follow-up 1 week after completion of antibiotics. All of the patient's questions have been answered to her satisfaction.       12/4/2024  Se Person MD               [1] No Known Allergies

## 2024-12-10 ENCOUNTER — OFFICE VISIT (OUTPATIENT)
Dept: SURGERY | Facility: CLINIC | Age: 24
End: 2024-12-10

## 2024-12-10 ENCOUNTER — TELEPHONE (OUTPATIENT)
Dept: SURGERY | Facility: CLINIC | Age: 24
End: 2024-12-10

## 2024-12-10 ENCOUNTER — LAB ENCOUNTER (OUTPATIENT)
Dept: LAB | Facility: HOSPITAL | Age: 24
End: 2024-12-10
Attending: OBSTETRICS & GYNECOLOGY
Payer: COMMERCIAL

## 2024-12-10 VITALS — WEIGHT: 190 LBS | BODY MASS INDEX: 33.66 KG/M2 | HEIGHT: 63 IN

## 2024-12-10 DIAGNOSIS — L98.8 PILONIDAL DISEASE: Primary | ICD-10-CM

## 2024-12-10 DIAGNOSIS — L05.91 PILONIDAL CYST: Primary | ICD-10-CM

## 2024-12-10 DIAGNOSIS — E28.2 PCOS (POLYCYSTIC OVARIAN SYNDROME): ICD-10-CM

## 2024-12-10 PROCEDURE — 99213 OFFICE O/P EST LOW 20 MIN: CPT | Performed by: SURGERY

## 2024-12-10 NOTE — TELEPHONE ENCOUNTER
Patient calling stating she was seen today  with Dr Se Person and was told a blood work order will be enter for patient but order is not in the system,patient is currently at the lab. Tried to reach out to RN station no answer  Please advise

## 2024-12-10 NOTE — PROGRESS NOTES
[facility]    Progress Note    Travis Rodarte Patient Status:  Specimen    2000 MRN [id number]   Location [facility] Attending No att. providers found   Hosp Day # 0 PCP No primary care provider on file.     Assessment and Plan:     Pilonidal disease post incision and drainage of abscess.  Much improved.  Plan elective definitive excision when convenient    Subjective:   Pain has improved.  Minimal drainage    Objective:   No data found.        Exam: Wound is healing well.  Scant drainage from the abscess    Results:     Lab Results   Component Value Date    TSH 1.808 2024       No results found.            Se Person MD  12/10/2024

## 2024-12-12 NOTE — TELEPHONE ENCOUNTER
Return call 12- at 10:50.   Patient is scheduled for surgery 1-9-2024.  She needs to have labs drawn for another MD and she is asking if we can combine PAT lab orders.  I will order CBC and UCG.  Patient was advised to have UCG the day before the surgery.    Daly

## 2024-12-12 NOTE — TELEPHONE ENCOUNTER
MD SAI  Orders placed for CBC and UCG for preadmission testing.  The patient is scheduled for Pilonidal cyst excision and requires routine blood work.  Please approve.     Daly

## 2024-12-23 ENCOUNTER — TELEPHONE (OUTPATIENT)
Dept: SURGERY | Facility: CLINIC | Age: 24
End: 2024-12-23

## 2024-12-23 DIAGNOSIS — L05.91 PILONIDAL CYST: Primary | ICD-10-CM

## 2024-12-23 NOTE — TELEPHONE ENCOUNTER
Per patient calling requesting that her procedure on 1/9/2025 be rescheduled. Please call back when available.

## 2025-01-05 ENCOUNTER — LAB ENCOUNTER (OUTPATIENT)
Dept: LAB | Facility: HOSPITAL | Age: 25
End: 2025-01-05
Attending: SURGERY
Payer: COMMERCIAL

## 2025-01-05 DIAGNOSIS — L05.91 PILONIDAL CYST: ICD-10-CM

## 2025-01-05 LAB
BASOPHILS # BLD AUTO: 0.05 X10(3) UL (ref 0–0.2)
BASOPHILS NFR BLD AUTO: 0.8 %
DEPRECATED RDW RBC AUTO: 42 FL (ref 35.1–46.3)
EOSINOPHIL # BLD AUTO: 0.22 X10(3) UL (ref 0–0.7)
EOSINOPHIL NFR BLD AUTO: 3.6 %
ERYTHROCYTE [DISTWIDTH] IN BLOOD BY AUTOMATED COUNT: 12.8 % (ref 11–15)
HCT VFR BLD AUTO: 38.7 %
HGB BLD-MCNC: 12.4 G/DL
IMM GRANULOCYTES # BLD AUTO: 0.01 X10(3) UL (ref 0–1)
IMM GRANULOCYTES NFR BLD: 0.2 %
LYMPHOCYTES # BLD AUTO: 2.63 X10(3) UL (ref 1–4)
LYMPHOCYTES NFR BLD AUTO: 42.6 %
MCH RBC QN AUTO: 28.4 PG (ref 26–34)
MCHC RBC AUTO-ENTMCNC: 32 G/DL (ref 31–37)
MCV RBC AUTO: 88.6 FL
MONOCYTES # BLD AUTO: 0.56 X10(3) UL (ref 0.1–1)
MONOCYTES NFR BLD AUTO: 9.1 %
NEUTROPHILS # BLD AUTO: 2.7 X10 (3) UL (ref 1.5–7.7)
NEUTROPHILS # BLD AUTO: 2.7 X10(3) UL (ref 1.5–7.7)
NEUTROPHILS NFR BLD AUTO: 43.7 %
PLATELET # BLD AUTO: 292 10(3)UL (ref 150–450)
PROGEST SERPL-MCNC: 0.94 NG/ML
RBC # BLD AUTO: 4.37 X10(6)UL
WBC # BLD AUTO: 6.2 X10(3) UL (ref 4–11)

## 2025-01-05 PROCEDURE — 85025 COMPLETE CBC W/AUTO DIFF WBC: CPT

## 2025-01-17 RX ORDER — CHOLECALCIFEROL (VITAMIN D3) 25 MCG
1 TABLET,CHEWABLE ORAL DAILY
COMMUNITY

## 2025-01-17 NOTE — DISCHARGE INSTRUCTIONS
Ice pack as needed.  May shower in 24 hours.  Remove outer dressing in 24 hours.  Avoid heavy lifting or deep bending squatting for 2 weeks.  Follow-up with PA in 2 weeks for wound check  Ibuprofen 600 mg every 6 hours for pain, Norco for breakthrough pain  Take Colace twice a day as a stool softener while taking Norco     HOME INSTRUCTIONS  AMBSURG HOME CARE INSTRUCTIONS: POST-OP ANESTHESIA  The medication that you received for sedation or general anesthesia can last up to 24 hours. Your judgment and reflexes may be altered, even if you feel like your normal self.      We Recommend:   Do not drive any motor vehicle or bicycle   Avoid mowing the lawn, playing sports, or working with power tools/applicances (power saws, electric knives or mixers)   That you have someone stay with you on your first night home   Do not drink alcohol or take sleeping pills or tranquilizers   Do not sign legal documents within 24 hours of your procedure   If you had a nerve block for your surgery, take extra care not to put any pressure on your arm or hand for 24 hours    It is normal:  For you to have a sore throat if you had a breathing tube during surgery (while you were asleep!). The sore throat should get better within 48 hours. You can gargle with warm salt water (1/2 tsp in 4 oz warm water) or use a throat lozenge for comfort  To feel muscle aches or soreness especially in the abdomen, chest or neck. The achy feeling should go away in the next 24 hours  To feel weak, sleepy or \"wiped out\". Your should start feeling better in the next 24 hours.   To experience mild discomforts such as sore lip or tongue, headache, cramps, gas pains or a bloated feeling in your abdomen.   To experience mild back pain or soreness for a day or two if you had spinal or epidural anesthesia.   If you had laparoscopic surgery, to feel shoulder pain or discomfort on the day of surgery.   For some patients to have nausea after surgery/anesthesia    If you  feel nausea or experience vomiting:   Try to move around less.   Eat less than usual or drink only liquids until the next morning   Nausea should resolve in about 24 hours    If you have a problem when you are at home:    Call your surgeons office   Discharge Instructions: After Your Surgery  You’ve just had surgery. During surgery, you were given medicine called anesthesia to keep you relaxed and free of pain. After surgery, you may have some pain or nausea. This is common. Here are some tips for feeling better and getting well after surgery.   Going home  Your healthcare provider will show you how to take care of yourself when you go home. They'll also answer your questions. Have an adult family member or friend drive you home. For the first 24 hours after your surgery:   Don't drive or use heavy equipment.  Don't make important decisions or sign legal papers.  Take medicines as directed.  Don't drink alcohol.  Have someone stay with you, if needed. They can watch for problems and help keep you safe.  Be sure to go to all follow-up visits with your healthcare provider. And rest after your surgery for as long as your provider tells you to.   Coping with pain  If you have pain after surgery, pain medicine will help you feel better. Take it as directed, before pain becomes severe. Also, ask your healthcare provider or pharmacist about other ways to control pain. This might be with heat, ice, or relaxation. And follow any other instructions your surgeon or nurse gives you.      Stay on schedule with your medicine.     Tips for taking pain medicine  To get the best relief possible, remember these points:   Pain medicines can upset your stomach. Taking them with a little food may help.  Most pain relievers taken by mouth need at least 20 to 30 minutes to start to work.  Don't wait till your pain becomes severe before you take your medicine. Try to time your medicine so that you can take it before starting an activity.  This might be before you get dressed, go for a walk, or sit down for dinner.  Constipation is a common side effect of some pain medicines. Call your healthcare provider before taking any medicines such as laxatives or stool softeners to help ease constipation. Also ask if you should skip any foods. Drinking lots of fluids and eating foods such as fruits and vegetables that are high in fiber can also help. Remember, don't take laxatives unless your surgeon has prescribed them.  Drinking alcohol and taking pain medicine can cause dizziness and slow your breathing. It can even be deadly. Don't drink alcohol while taking pain medicine.  Pain medicine can make you react more slowly to things. Don't drive or run machinery while taking pain medicine.  Your healthcare provider may tell you to take acetaminophen to help ease your pain. Ask them how much you're supposed to take each day. Acetaminophen or other pain relievers may interact with your prescription medicines or other over-the-counter (OTC) medicines. Some prescription medicines have acetaminophen and other ingredients in them. Using both prescription and OTC acetaminophen for pain can cause you to accidentally overdose. Read the labels on your OTC medicines with care. This will help you to clearly know the list of ingredients, how much to take, and any warnings. It may also help you not take too much acetaminophen. If you have questions or don't understand the information, ask your pharmacist or healthcare provider to explain it to you before you take the OTC medicine.   Managing nausea  Some people have an upset stomach (nausea) after surgery. This is often because of anesthesia, pain, or pain medicine, less movement of food in the stomach, or the stress of surgery. These tips will help you handle nausea and eat healthy foods as you get better. If you were on a special food plan before surgery, ask your healthcare provider if you should follow it while you get  better. Check with your provider on how your eating should progress. It may depend on the surgery you had. These general tips may help:   Don't push yourself to eat. Your body will tell you when to eat and how much.  Start off with clear liquids and soup. They're easier to digest.  Next try semi-solid foods as you feel ready. These include mashed potatoes, applesauce, and gelatin.  Slowly move to solid foods. Don’t eat fatty, rich, or spicy foods at first.  Don't force yourself to have 3 large meals a day. Instead eat smaller amounts more often.  Take pain medicines with a small amount of solid food, such as crackers or toast. This helps prevent nausea.  When to call your healthcare provider  Call your healthcare provider right away if you have any of these:   You still have too much pain, or the pain gets worse, after taking the medicine. The medicine may not be strong enough. Or there may be a complication from the surgery.  You feel too sleepy, dizzy, or groggy. The medicine may be too strong.  Side effects such as nausea or vomiting. Your healthcare provider may advise taking other medicines to .  Skin changes such as rash, itching, or hives. This may mean you have an allergic reaction. Your provider may advise taking other medicines.  The incision looks different (for instance, part of it opens up).  Bleeding or fluid leaking from the incision site, and weren't told to expect that.  Fever of 100.4°F (38°C) or higher, or as directed by your provider.  Call 911  Call 911 right away if you have:   Trouble breathing  Facial swelling    If you have obstructive sleep apnea   You were given anesthesia medicine during surgery to keep you comfortable and free of pain. After surgery, you may have more apnea spells because of this medicine and other medicines you were given. The spells may last longer than normal.    At home:  Keep using the continuous positive airway pressure (CPAP) device when you sleep. Unless your  healthcare provider tells you not to, use it when you sleep, day or night. CPAP is a common device used to treat obstructive sleep apnea.  Talk with your provider before taking any pain medicine, muscle relaxants, or sedatives. Your provider will tell you about the possible dangers of taking these medicines.  Contact your provider if your sleeping changes a lot even when taking medicines as directed.  [employer] last reviewed this educational content on 10/1/2021  © 5584-8365 [employer]. All rights reserved. This information is not intended as a substitute for professional medical care. Always follow your healthcare professional's instructions.

## 2025-01-21 ENCOUNTER — ANESTHESIA (OUTPATIENT)
Dept: SURGERY | Facility: HOSPITAL | Age: 25
End: 2025-01-21
Payer: COMMERCIAL

## 2025-01-21 ENCOUNTER — ANESTHESIA EVENT (OUTPATIENT)
Dept: SURGERY | Facility: HOSPITAL | Age: 25
End: 2025-01-21
Payer: COMMERCIAL

## 2025-01-21 ENCOUNTER — HOSPITAL ENCOUNTER (OUTPATIENT)
Facility: HOSPITAL | Age: 25
Setting detail: HOSPITAL OUTPATIENT SURGERY
Discharge: HOME | End: 2025-01-21
Attending: SURGERY | Admitting: SURGERY
Payer: COMMERCIAL

## 2025-01-21 VITALS
WEIGHT: 188 LBS | OXYGEN SATURATION: 98 % | RESPIRATION RATE: 16 BRPM | SYSTOLIC BLOOD PRESSURE: 131 MMHG | HEART RATE: 91 BPM | HEIGHT: 63 IN | TEMPERATURE: 98 F | DIASTOLIC BLOOD PRESSURE: 48 MMHG | BODY MASS INDEX: 33.31 KG/M2

## 2025-01-21 DIAGNOSIS — L98.8 PILONIDAL DISEASE: ICD-10-CM

## 2025-01-21 LAB — B-HCG UR QL: NEGATIVE

## 2025-01-21 PROCEDURE — 0JB90ZZ EXCISION OF BUTTOCK SUBCUTANEOUS TISSUE AND FASCIA, OPEN APPROACH: ICD-10-PCS | Performed by: SURGERY

## 2025-01-21 PROCEDURE — 88304 TISSUE EXAM BY PATHOLOGIST: CPT | Performed by: SURGERY

## 2025-01-21 PROCEDURE — 81025 URINE PREGNANCY TEST: CPT

## 2025-01-21 PROCEDURE — 0HX8XZZ TRANSFER BUTTOCK SKIN, EXTERNAL APPROACH: ICD-10-PCS | Performed by: SURGERY

## 2025-01-21 RX ORDER — NALOXONE HYDROCHLORIDE 0.4 MG/ML
0.08 INJECTION, SOLUTION INTRAMUSCULAR; INTRAVENOUS; SUBCUTANEOUS AS NEEDED
Status: DISCONTINUED | OUTPATIENT
Start: 2025-01-21 | End: 2025-01-21

## 2025-01-21 RX ORDER — FAMOTIDINE 20 MG/1
20 TABLET, FILM COATED ORAL ONCE
Status: COMPLETED | OUTPATIENT
Start: 2025-01-21 | End: 2025-01-21

## 2025-01-21 RX ORDER — FAMOTIDINE 10 MG/ML
20 INJECTION, SOLUTION INTRAVENOUS ONCE
Status: COMPLETED | OUTPATIENT
Start: 2025-01-21 | End: 2025-01-21

## 2025-01-21 RX ORDER — SODIUM CHLORIDE, SODIUM LACTATE, POTASSIUM CHLORIDE, CALCIUM CHLORIDE 600; 310; 30; 20 MG/100ML; MG/100ML; MG/100ML; MG/100ML
INJECTION, SOLUTION INTRAVENOUS CONTINUOUS
Status: DISCONTINUED | OUTPATIENT
Start: 2025-01-21 | End: 2025-01-21

## 2025-01-21 RX ORDER — ACETAMINOPHEN 500 MG
1000 TABLET ORAL ONCE
Status: COMPLETED | OUTPATIENT
Start: 2025-01-21 | End: 2025-01-21

## 2025-01-21 RX ORDER — DEXAMETHASONE SODIUM PHOSPHATE 4 MG/ML
VIAL (ML) INJECTION AS NEEDED
Status: DISCONTINUED | OUTPATIENT
Start: 2025-01-21 | End: 2025-01-21 | Stop reason: SURG

## 2025-01-21 RX ORDER — MIDAZOLAM HYDROCHLORIDE 1 MG/ML
INJECTION INTRAMUSCULAR; INTRAVENOUS AS NEEDED
Status: DISCONTINUED | OUTPATIENT
Start: 2025-01-21 | End: 2025-01-21 | Stop reason: SURG

## 2025-01-21 RX ORDER — BUPIVACAINE HYDROCHLORIDE AND EPINEPHRINE 2.5; 5 MG/ML; UG/ML
INJECTION, SOLUTION INFILTRATION; PERINEURAL AS NEEDED
Status: DISCONTINUED | OUTPATIENT
Start: 2025-01-21 | End: 2025-01-21 | Stop reason: HOSPADM

## 2025-01-21 RX ORDER — LIDOCAINE HYDROCHLORIDE 10 MG/ML
INJECTION, SOLUTION EPIDURAL; INFILTRATION; INTRACAUDAL; PERINEURAL AS NEEDED
Status: DISCONTINUED | OUTPATIENT
Start: 2025-01-21 | End: 2025-01-21 | Stop reason: SURG

## 2025-01-21 RX ORDER — ESMOLOL HYDROCHLORIDE 10 MG/ML
INJECTION INTRAVENOUS AS NEEDED
Status: DISCONTINUED | OUTPATIENT
Start: 2025-01-21 | End: 2025-01-21 | Stop reason: SURG

## 2025-01-21 RX ORDER — DOCUSATE SODIUM 100 MG/1
100 CAPSULE, LIQUID FILLED ORAL 2 TIMES DAILY
Qty: 30 CAPSULE | Refills: 0 | Status: SHIPPED | OUTPATIENT
Start: 2025-01-21

## 2025-01-21 RX ORDER — METOCLOPRAMIDE HYDROCHLORIDE 5 MG/ML
10 INJECTION INTRAMUSCULAR; INTRAVENOUS ONCE
Status: COMPLETED | OUTPATIENT
Start: 2025-01-21 | End: 2025-01-21

## 2025-01-21 RX ORDER — KETOROLAC TROMETHAMINE 30 MG/ML
INJECTION, SOLUTION INTRAMUSCULAR; INTRAVENOUS AS NEEDED
Status: DISCONTINUED | OUTPATIENT
Start: 2025-01-21 | End: 2025-01-21 | Stop reason: SURG

## 2025-01-21 RX ORDER — ONDANSETRON 2 MG/ML
4 INJECTION INTRAMUSCULAR; INTRAVENOUS EVERY 6 HOURS PRN
Status: DISCONTINUED | OUTPATIENT
Start: 2025-01-21 | End: 2025-01-21

## 2025-01-21 RX ORDER — GLYCOPYRROLATE 0.2 MG/ML
INJECTION, SOLUTION INTRAMUSCULAR; INTRAVENOUS AS NEEDED
Status: DISCONTINUED | OUTPATIENT
Start: 2025-01-21 | End: 2025-01-21 | Stop reason: SURG

## 2025-01-21 RX ORDER — HYDROCODONE BITARTRATE AND ACETAMINOPHEN 5; 325 MG/1; MG/1
1 TABLET ORAL EVERY 6 HOURS PRN
Qty: 10 TABLET | Refills: 0 | Status: SHIPPED | OUTPATIENT
Start: 2025-01-21

## 2025-01-21 RX ORDER — IBUPROFEN 600 MG/1
600 TABLET, FILM COATED ORAL EVERY 6 HOURS PRN
Qty: 15 TABLET | Refills: 1 | Status: SHIPPED | OUTPATIENT
Start: 2025-01-21 | End: 2025-01-28

## 2025-01-21 RX ORDER — HYDROMORPHONE HYDROCHLORIDE 1 MG/ML
0.4 INJECTION, SOLUTION INTRAMUSCULAR; INTRAVENOUS; SUBCUTANEOUS EVERY 5 MIN PRN
Status: DISCONTINUED | OUTPATIENT
Start: 2025-01-21 | End: 2025-01-21

## 2025-01-21 RX ORDER — PROCHLORPERAZINE EDISYLATE 5 MG/ML
5 INJECTION INTRAMUSCULAR; INTRAVENOUS EVERY 8 HOURS PRN
Status: DISCONTINUED | OUTPATIENT
Start: 2025-01-21 | End: 2025-01-21

## 2025-01-21 RX ORDER — METRONIDAZOLE 500 MG/100ML
500 INJECTION, SOLUTION INTRAVENOUS ONCE
Status: DISCONTINUED | OUTPATIENT
Start: 2025-01-21 | End: 2025-01-21 | Stop reason: HOSPADM

## 2025-01-21 RX ORDER — ONDANSETRON 2 MG/ML
INJECTION INTRAMUSCULAR; INTRAVENOUS AS NEEDED
Status: DISCONTINUED | OUTPATIENT
Start: 2025-01-21 | End: 2025-01-21 | Stop reason: SURG

## 2025-01-21 RX ORDER — HYDROMORPHONE HYDROCHLORIDE 1 MG/ML
0.6 INJECTION, SOLUTION INTRAMUSCULAR; INTRAVENOUS; SUBCUTANEOUS EVERY 5 MIN PRN
Status: DISCONTINUED | OUTPATIENT
Start: 2025-01-21 | End: 2025-01-21

## 2025-01-21 RX ORDER — HYDROMORPHONE HYDROCHLORIDE 1 MG/ML
0.2 INJECTION, SOLUTION INTRAMUSCULAR; INTRAVENOUS; SUBCUTANEOUS EVERY 5 MIN PRN
Status: DISCONTINUED | OUTPATIENT
Start: 2025-01-21 | End: 2025-01-21

## 2025-01-21 RX ORDER — METOCLOPRAMIDE 10 MG/1
10 TABLET ORAL ONCE
Status: COMPLETED | OUTPATIENT
Start: 2025-01-21 | End: 2025-01-21

## 2025-01-21 RX ORDER — ROCURONIUM BROMIDE 10 MG/ML
INJECTION, SOLUTION INTRAVENOUS AS NEEDED
Status: DISCONTINUED | OUTPATIENT
Start: 2025-01-21 | End: 2025-01-21 | Stop reason: SURG

## 2025-01-21 RX ADMIN — ESMOLOL HYDROCHLORIDE 10 MG: 10 INJECTION INTRAVENOUS at 10:51:00

## 2025-01-21 RX ADMIN — ONDANSETRON 4 MG: 2 INJECTION INTRAMUSCULAR; INTRAVENOUS at 10:41:00

## 2025-01-21 RX ADMIN — DEXAMETHASONE SODIUM PHOSPHATE 8 MG: 4 MG/ML VIAL (ML) INJECTION at 10:41:00

## 2025-01-21 RX ADMIN — GLYCOPYRROLATE 0.2 MG: 0.2 INJECTION, SOLUTION INTRAMUSCULAR; INTRAVENOUS at 10:32:00

## 2025-01-21 RX ADMIN — ESMOLOL HYDROCHLORIDE 10 MG: 10 INJECTION INTRAVENOUS at 10:43:00

## 2025-01-21 RX ADMIN — LIDOCAINE HYDROCHLORIDE 50 MG: 10 INJECTION, SOLUTION EPIDURAL; INFILTRATION; INTRACAUDAL; PERINEURAL at 10:33:00

## 2025-01-21 RX ADMIN — ROCURONIUM BROMIDE 50 MG: 10 INJECTION, SOLUTION INTRAVENOUS at 10:33:00

## 2025-01-21 RX ADMIN — ESMOLOL HYDROCHLORIDE 10 MG: 10 INJECTION INTRAVENOUS at 10:45:00

## 2025-01-21 RX ADMIN — KETOROLAC TROMETHAMINE 30 MG: 30 INJECTION, SOLUTION INTRAMUSCULAR; INTRAVENOUS at 11:01:00

## 2025-01-21 RX ADMIN — MIDAZOLAM HYDROCHLORIDE 2 MG: 1 INJECTION INTRAMUSCULAR; INTRAVENOUS at 10:29:00

## 2025-01-21 NOTE — H&P
Se Person MD  Physician  Specialty: SURGERY, GENERAL     H&P     Signed     Encounter Date: 12/4/2024     Signed       Expand All Collapse All    History and Physical        HPI           Chief Complaint   Patient presents with    Referral       ED referral for Pilonidal cyst.  Patient went to ED on 12-2-24 and had infected Pilonidal cyst.  Patient has acute pain and bloody discharge.           HPI  Travis Rodarte is a 24 year old female who presents with pilonidal disease.  She was seen in the emergency room with pain and drainage.  Placed on antibiotics.  She had a incision and drainage done in the emergency room.           Past Surgical History   History reviewed. No pertinent surgical history.     Current Medications          Current Outpatient Medications   Medication Sig Dispense Refill    doxycycline 100 MG Oral Cap Take 1 capsule (100 mg total) by mouth 2 (two) times daily for 7 days. 14 capsule 0    Ketorolac Tromethamine 10 MG Oral Tab Take 1 tablet (10 mg total) by mouth every 6 (six) hours as needed for Pain. 15 tablet 0    metFORMIN  MG Oral Tablet 24 Hr Take 3 tablets (1,500 mg total) by mouth daily with breakfast. 270 tablet 2         ALLERGIES  [Allergies]    [Allergies]  No Known Allergies       Set as collapsible by default.   Social History           Socioeconomic History    Marital status: Single   Tobacco Use    Smoking status: Unknown   Substance and Sexual Activity    Sexual activity: Yes         Family History   History reviewed. No pertinent family history.        Review of Systems   A comprehensive 10 point review of systems was completed.  Pertinent positives and negatives noted in the the HPI.     PHYSICAL EXAM   Ht 5' 3\" (1.6 m)   Wt 190 lb (86.2 kg)   LMP 11/10/2024 (Exact Date)   BMI 33.66 kg/m²  Patient's last menstrual period was 11/10/2024 (exact date).   Constitutional: appears well hydrated alert and responsive no acute distress noted  Head/Face:  normocephalic  Nose/Mouth/Throat: nose and throat are clear palate is intact mucous membranes are moist no oral lesions are noted  Neck/Thyroid: neck is supple without adenopathy  Respiratory: normal to inspection lungs are clear to auscultation bilaterally normal respiratory effort  Cardiovascular: regular rate and rhythm no murmurs, gallups, or rubs  Abdomen: soft non-tender non-distended no organomegaly noted no masses  Extremities: no edema, cyanosis, or clubbing  Neurological: exam appropriate for age reflexes and motor skills appropriate for age    Anococcygeal cleft-packing removed and a large amount of pus evacuated.  Cleaned with gauze.  ASSESSMENT/PLAN      Assessment       Encounter Diagnosis   Name Primary?    Pilonidal disease Yes         24 year old female with pilonidal abscess post I&D  We have discussed the surgical risks, benefits, alternatives, and expected recovery. We will plan change gauze daily.  Take warm showers to the area daily follow-up 1 week after completion of antibiotics. All of the patient's questions have been answered to her satisfaction.           1/21/25  Se Person MD                              Electronically signed by Se Person MD at 12/4/2024  2:05 PM         Office Visit on 12/4/2024            Detailed Report          Note viewed by patient  Additional Documentation    Vitals: Ht 5' 3\" (1.6 m)     Wt 190 lb (86.2 kg)     LMP 11/10/2024 (Exact Date)     BMI 33.66 kg/m²     BSA 1.89 m²   Flowsheets: Energy Needs   Encounter Info: Billing Info,     History,     Allergies,     Detailed Report     Chart Review: Note Routing History    No routing history on file.  Orders Placed    None  Medication Changes      HYDROcodone-Acetaminophen 5-325 MG 1 tablet Oral Every 6 hours PRN  Medication List  Visit Diagnoses      Pilonidal disease  Problem List

## 2025-01-21 NOTE — ANESTHESIA POSTPROCEDURE EVALUATION
Patient: Travis Rodarte    Procedure Summary       Date: 01/21/25 Room / Location: [facility] MAIN OR 03 / [facility] MAIN OR    Anesthesia Start: 1029 Anesthesia Stop: 1127    Procedure: Wide excision pilonidal with flap closure (Buttocks) Diagnosis:       Pilonidal disease      (Pilonidal disease [L98.8])    Surgeons: Se Person MD Anesthesiologist: Gama Farley DO    Anesthesia Type: general ASA Status: 2            Anesthesia Type: general    Vitals Value Taken Time   /57 01/21/25 1121   Temp 97.6 01/21/25 1127   Pulse 134 01/21/25 1127   Resp 18 01/21/25 1127   SpO2 98 % 01/21/25 1127   Vitals shown include unfiled device data.    [facility] AN Post Evaluation:   Patient Evaluated in PACU  Patient Participation: complete - patient participated  Level of Consciousness: awake and alert  Pain Score: 0  Pain Management: adequate  Airway Patency:patent  Yes    Nausea/Vomiting: none  Cardiovascular Status: acceptable  Respiratory Status: acceptable  Postoperative Hydration acceptable      Lali Concepcion CRNA  1/21/2025 11:27 AM

## 2025-01-21 NOTE — ANESTHESIA PREPROCEDURE EVALUATION
Anesthesia PreOp Note    HPI:     Travis Rodarte is a 24 year old female who presents for preoperative consultation requested by: Se Person MD    Date of Surgery: 1/21/2025    Procedure(s):  Wide excision pilonidal with flap closure  Indication: Pilonidal disease [L98.8]    Relevant Problems   No relevant active problems       NPO:  Last Liquid Consumption Date: 01/20/25  Last Liquid Consumption Time: 2300  Last Solid Consumption Date: 01/20/25  Last Solid Consumption Time: 1900  Last Liquid Consumption Date: 01/20/25          History Review:  Patient Active Problem List    Diagnosis Date Noted    PCOS (polycystic ovarian syndrome) 07/05/2024       Past Medical History:    Polycystic ovarian syndrome    Visual impairment    contacts/glasses       Past Surgical History:   Procedure Laterality Date    [place] teeth removed         Prescriptions Prior to Admission[1]  Current Medications and Prescriptions Ordered in Epic[2]    Allergies[3]    History reviewed. No pertinent family history.  Social History     Socioeconomic History    Marital status: Single   Tobacco Use    Smoking status: Never    Smokeless tobacco: Never   Vaping Use    Vaping status: Never Used   Substance and Sexual Activity    Alcohol use: Yes     Comment: occassional    Drug use: Never    Sexual activity: Yes       Available pre-op labs reviewed.  Lab Results   Component Value Date    WBC 6.2 01/05/2025    RBC 4.37 01/05/2025    HGB 12.4 01/05/2025    HCT 38.7 01/05/2025    MCV 88.6 01/05/2025    MCH 28.4 01/05/2025    MCHC 32.0 01/05/2025    RDW 12.8 01/05/2025    .0 01/05/2025    URINEPREG Negative 01/21/2025             Vital Signs:  Body mass index is 33.3 kg/m².   height is 1.6 m (5' 3\") and weight is 85.3 kg (188 lb). Her oral temperature is 97.9 °F (36.6 °C). Her blood pressure is 130/83 and her pulse is 112. Her respiration is 14 and oxygen saturation is 100%.   Vitals:    01/17/25 1001 01/21/25 0911   BP:  130/83    Pulse:  112   Resp:  14   Temp:  97.9 °F (36.6 °C)   TempSrc:  Oral   SpO2:  100%   Weight: 86.2 kg (190 lb) 85.3 kg (188 lb)   Height: 1.6 m (5' 3\")         Anesthesia Evaluation      No history of anesthetic complications   Airway   Mallampati: II  TM distance: >3 FB  Neck ROM: full  Dental      Pulmonary - normal exam   (-) asthma  Cardiovascular - normal exam  (-) hypertension    Neuro/Psych - negative ROS     GI/Hepatic/Renal - negative ROS     Endo/Other    (-) diabetes mellitus    Comments: PCOS  Abdominal  - normal exam  (-) obese                 Anesthesia Plan:   ASA:  2  Plan:   General  Plan Comments: I have discussed the anesthetic plan, major risks and alternatives with the patient and answered all questions.  Minor and major side effects were discussed with patient, including but not limited to: injury to teeth/gums/lips, aspiration, nausea/vomiting postoperatively, anaphylaxis, heart attack, stroke, post-operative ventilation and death. The patient desires to proceed with surgery and anesthesia as planned. All questions answered.    Informed Consent Plan and Risks Discussed With:  Patient  Discussed plan with:  CRNA      I have informed Travis Rodarte and/or legal guardian or family member of the nature of the anesthetic plan, benefits, risks including possible dental damage if relevant, major complications, and any alternative forms of anesthetic management.   All of the patient's questions were answered to the best of my ability. The patient desires the anesthetic management as planned.  Gama Farley DO  1/21/2025 9:26 AM  Present on Admission:  **None**           [1]   Medications Prior to Admission   Medication Sig Dispense Refill Last Dose/Taking    prenatal vitamin with DHA 27-0.8-228 MG Oral Cap Take 1 capsule by mouth daily.   More than a month    Omega 3-6-9 Fatty Acids (OMEGA 3-6-9 OR) Take by mouth daily.   More than a month    HYDROcodone-acetaminophen 5-325 MG Oral Tab Take 1  tablet by mouth every 6 (six) hours as needed for Pain. (Patient taking differently: Take 1 tablet by mouth every 6 (six) hours as needed for Pain. Not taking) 10 tablet 0 More than a month    [] Ketorolac Tromethamine 10 MG Oral Tab Take 1 tablet (10 mg total) by mouth every 6 (six) hours as needed for Pain. (Patient taking differently: Take 1 tablet (10 mg total) by mouth every 6 (six) hours as needed for Pain. Not taking) 15 tablet 0     metFORMIN  MG Oral Tablet 24 Hr Take 3 tablets (1,500 mg total) by mouth daily with breakfast. 270 tablet 2 More than a month   [2]   Current Facility-Administered Medications Ordered in Epic   Medication Dose Route Frequency Provider Last Rate Last Admin    lactated ringers infusion   Intravenous Continuous Se Person MD 20 mL/hr at 25 0917 New Bag at 25 0917    ceFAZolin (Ancef) 2g in 10mL IV syringe premix  2 g Intravenous Once Se Person MD        And    metroNIDAZOLE in sodium chloride 0.79% (Flagyl) 5 mg/mL IVPB premix 500 mg  500 mg Intravenous Once Se Person MD         No current [facility]-ordered outpatient medications on file.   [3] No Known Allergies

## 2025-01-21 NOTE — OPERATIVE REPORT
[facility]    PATIENT'S NAME: SAMINA BEE   ATTENDING PHYSICIAN: Se Person MD   OPERATING PHYSICIAN: Se Person MD   PATIENT ACCOUNT#:   [id number]    LOCATION:  [address]  MEDICAL RECORD #:   [id number]       YOB: 2000  ADMISSION DATE:       01/21/2025      OPERATION DATE:  01/21/2025    OPERATIVE REPORT      PREOPERATIVE DIAGNOSIS:  Pilonidal disease.  POSTOPERATIVE DIAGNOSIS:  Pilonidal disease.  PROCEDURE:  Wide excision of pilonidal disease with Karydakis advancement flap closure.    ASSISTANTS:  KIRT Ribeiro; Jeannette Shore PA-C    ESTIMATED BLOOD LOSS:  5 mL.    COMPLICATIONS:  None.    ANESTHESIA:  General.    DISPOSITION:  To Recovery, tolerated well.    INDICATIONS:  The patient is 24, post multiple recurrences, presents for wide excision.  Consent obtained.    OPERATIVE TECHNIQUE:  She was taken to surgery, placed in prone position, prepped and draped in usual sterile fashion.  Wide elliptical incision was made, removing the pits and the fistula tract.  This was carried down to the sacral fascia.  Normal subcutaneous tissue identified.  Karydakis flap was created using electrocautery.  Wound closed in multiple layers including 2-0 Monocryl, 3-0 Monocryl, and Dermabond.  Sterile dressings applied.  She tolerated this well.    Dictated By Se Person MD  d: 01/21/2025 11:05:56  t: 01/21/2025 16:13:27  Job [id number]  GL/

## 2025-01-21 NOTE — INTERVAL H&P NOTE
Pre-op Diagnosis: Pilonidal disease [L98.8]    The above referenced H&P was reviewed by Se Person MD on 1/21/2025, the patient was examined and no significant changes have occurred in the patient's condition since the H&P was performed.  I discussed with the patient and/or legal representative the potential benefits, risks and side effects of this procedure; the likelihood of the patient achieving goals; and potential problems that might occur during recuperation.  I discussed reasonable alternatives to the procedure, including risks, benefits and side effects related to the alternatives and risks related to not receiving this procedure.  We will proceed with procedure as planned.

## 2025-01-21 NOTE — ANESTHESIA PROCEDURE NOTES
Airway  Date/Time: 1/21/2025 10:35 AM  Urgency: Elective      General Information and Staff    Patient location during procedure: OR  Resident/CRNA: Lali Concepcion CRNA  Performed: CRNA   Performed by: Lali Concepcion CRNA  Authorized by: Gama Farley DO      Indications and Patient Condition  Indications for airway management: anesthesia  Sedation level: deep  Preoxygenated: yes  Patient position: sniffing  Mask difficulty assessment: 1 - vent by mask    Final Airway Details  Final airway type: endotracheal airway      Successful airway: ETT  Cuffed: yes   Successful intubation technique: direct laryngoscopy  Endotracheal tube insertion site: oral  Blade: Potts  Blade size: #2  ETT size (mm): 7.0    Cormack-Lehane Classification: grade I - full view of glottis  Placement verified by: capnometry   Measured from: teeth  ETT to teeth (cm): 21  Number of attempts at approach: 1    Additional Comments  Dentition and oral mucosa per preop assessment, post intubation. Head/neck remained in neutral position.      
no

## 2025-02-03 ENCOUNTER — TELEPHONE (OUTPATIENT)
Dept: SURGERY | Facility: CLINIC | Age: 25
End: 2025-02-03

## 2025-02-03 NOTE — TELEPHONE ENCOUNTER
Per patient asking if she is supposed to be out of work until first post op appointment on 2/7? Please call thank you.

## 2025-02-03 NOTE — TELEPHONE ENCOUNTER
Patient calling to see if we received her short term disability forms from the [facility]. Was told they were faxed this morning. Looked through faxed from today and do not see any forms yet. Patient will email the forms herself and call us back.     Type of Leave: STD  Reason for Leave: surgery on 1/21/25  Start date of leave: 1/21/25  End date of leave: pending pos op on 2/7/25   How many flare ups per month/length?:  How many appts per month/length?:   Was Fee and Turnaround info Given?: yes

## 2025-02-05 ENCOUNTER — TELEPHONE (OUTPATIENT)
Dept: SURGERY | Facility: CLINIC | Age: 25
End: 2025-02-05

## 2025-02-07 ENCOUNTER — NURSE ONLY (OUTPATIENT)
Dept: SURGERY | Facility: CLINIC | Age: 25
End: 2025-02-07
Payer: COMMERCIAL

## 2025-02-07 VITALS
BODY MASS INDEX: 33 KG/M2 | SYSTOLIC BLOOD PRESSURE: 120 MMHG | HEART RATE: 107 BPM | DIASTOLIC BLOOD PRESSURE: 77 MMHG | WEIGHT: 188 LBS

## 2025-02-07 DIAGNOSIS — Z48.89 ENCOUNTER FOR POSTOPERATIVE CARE: Primary | ICD-10-CM

## 2025-02-07 PROCEDURE — 3078F DIAST BP <80 MM HG: CPT

## 2025-02-07 PROCEDURE — 3074F SYST BP LT 130 MM HG: CPT

## 2025-02-07 PROCEDURE — 99024 POSTOP FOLLOW-UP VISIT: CPT

## 2025-02-07 NOTE — PROGRESS NOTES
Follow Up Visit Note       Active Problems      1. Encounter for postoperative care          Chief Complaint   Chief Complaint   Patient presents with    Post-Op     S/P pilonidal cystectomy 1/21.  Patient states some soreness, no real pain.  No drainage.  Denies fevers.         History of Present Illness  Travis is a pleasant 24 year old year old patient presenting for post op appointment. She generally feels well, she denies incision site pain. She does have some discomfort. She is tolerating a general diet without diarrhea or constipation. She denies fever, chills, SOB, chest pain, leg swelling or calf tenderness.       Allergies  Travis has No Known Allergies.    Past Medical / Surgical / Social / Family History    The past medical and past surgical history have been reviewed by me today.    Past Medical History:    Polycystic ovarian syndrome    Visual impairment    contacts/glasses     Past Surgical History:   Procedure Laterality Date    Pickett teeth removed         The family history and social history have been reviewed by me today.    History reviewed. No pertinent family history.  Social History     Socioeconomic History    Marital status: Single   Tobacco Use    Smoking status: Never    Smokeless tobacco: Never   Vaping Use    Vaping status: Never Used   Substance and Sexual Activity    Alcohol use: Yes     Comment: occassional    Drug use: Never    Sexual activity: Yes        Current Outpatient Medications:     docusate sodium 100 MG Oral Cap, Take 1 capsule (100 mg total) by mouth 2 (two) times daily., Disp: 30 capsule, Rfl: 0    prenatal vitamin with DHA 27-0.8-228 MG Oral Cap, Take 1 capsule by mouth daily., Disp: , Rfl:     Omega 3-6-9 Fatty Acids (OMEGA 3-6-9 OR), Take by mouth daily., Disp: , Rfl:     metFORMIN  MG Oral Tablet 24 Hr, Take 3 tablets (1,500 mg total) by mouth daily with breakfast., Disp: 270 tablet, Rfl: 2     Review of Systems  The Review of Systems has been reviewed by me  during today.  Review of Systems   Constitutional:  Negative for chills, fatigue and fever.   Respiratory:  Negative for shortness of breath.    Cardiovascular:  Negative for chest pain and leg swelling.   Gastrointestinal:  Negative for abdominal pain, constipation, diarrhea, nausea and vomiting.        Physical Findings   /77 (BP Location: Left arm, Patient Position: Sitting, Cuff Size: large)   Pulse 107   Wt 188 lb (85.3 kg)   LMP 12/15/2024 (Exact Date)   BMI 33.30 kg/m²   Physical Exam  Constitutional:       General: She is not in acute distress.     Appearance: She is not ill-appearing.   HENT:      Head: Normocephalic and atraumatic.   Eyes:      Extraocular Movements: Extraocular movements intact.      Conjunctiva/sclera: Conjunctivae normal.      Pupils: Pupils are equal, round, and reactive to light.   Abdominal:      General: Abdomen is flat. There is no distension.      Palpations: Abdomen is soft.      Tenderness: There is no abdominal tenderness.   Skin:     General: Skin is warm.      Comments: incision C/D/I.   Neurological:      Mental Status: She is alert.          Assessment   1. Encounter for postoperative care      Pathology reviewed with the patient    Plan   Continue good hygiene of incision site. Shower daily, wash with warm water and soap. Avoid compressive clothing.   OK to swim or take a bath  Follow up as needed       No orders of the defined types were placed in this encounter.      Imaging & Referrals   None    Follow Up  No follow-ups on file.    LUIS ANGEL Perdomo

## 2025-02-11 NOTE — TELEPHONE ENCOUNTER
Patient called to see if disability paperwork has been received. Located forms and logged for processing. Advised patient of forms department current processing time which is currently 10-15 business days. Patient is requesting to have forms processed as a STAT.

## 2025-02-11 NOTE — TELEPHONE ENCOUNTER
,     Please sign off on form if you agree to: disability due to Pilonidal disease/sx      -Signature page will be the first page scanned  -From your Inbasket, Highlight the patient and click Chart   -Double click the 2/3/25 Forms Completion telephone encounter  -Scroll down to the Media section   -Click the blue Hyperlink: disability  2/11/25  -Click Acknowledge located in the top right ribbon/menu   -Drag the mouse into the blank space of the document and a + sign will appear. Left click to   electronically sign the document.  -Once signed, simply exit out of the screen and you signature will be saved.     Thank you,     Sandra

## (undated) DEVICE — BLADE ELECTRODE: Brand: EDGE

## (undated) DEVICE — SUT MCRYL 2-0 27IN SH ABSRB UD 26MM 1/2 CIR

## (undated) DEVICE — PAD,ABDOMINAL,8"X7.5",STERILE,LF,1/PK: Brand: MEDLINE

## (undated) DEVICE — GLOVE SUR 7 SENSICARE PI MIC PIP CRM PWD F

## (undated) DEVICE — POSITIONER HEAD PRONE VOSS

## (undated) DEVICE — SKIN PREP TRAY 4 COMPARTM TRAY: Brand: MEDLINE INDUSTRIES, INC.

## (undated) DEVICE — SHEET,DRAPE,53X77,STERILE: Brand: MEDLINE

## (undated) DEVICE — GLOVE SUR 8 SENSICARE PI PIP GRN PWD F

## (undated) DEVICE — SUT MCRYL 3-0 18IN PS-2 ABSRB UD 19MM 3/8 CIR

## (undated) DEVICE — SHEET, T, LAPAROTOMY, STERILE: Brand: MEDLINE

## (undated) DEVICE — 3M™ MICROFOAM™ TAPE 1528-4: Brand: 3M™ MICROFOAM™

## (undated) DEVICE — ADHESIVE SKIN TOP FOR WND CLSR DERMBND ADV

## (undated) DEVICE — GLOVE SUR 8 SENSICARE PI PIP CRM PWD F

## (undated) DEVICE — MINOR GENERAL: Brand: MEDLINE INDUSTRIES, INC.

## (undated) DEVICE — 3M™ TEGADERM™ TRANSPARENT FILM DRESSING FRAME STYLE, 1626W, 4 IN X 4-3/4 IN (10 CM X 12 CM), 50/CT 4CT/CASE: Brand: 3M™ TEGADERM™

## (undated) DEVICE — CAUTERY: TIP CLEANER XR 100/CS: Brand: MEDICAL ACTION INDUSTRIES

## (undated) NOTE — LETTER
2/7/2025          To Whom It May Concern:    Travis Rodarte is currently under my medical care and may return to work on Monday, February 10, 2025.  Activity is restricted as follows: none.  If you require additional information please contact our office.        Sincerely,          [facility] SUR[facility] PA          Document generated by:  KASSIE DIANE

## (undated) NOTE — LETTER
Date & Time: 12/2/2024, 2:15 PM  Patient: Travis Rodarte  Encounter Provider(s):    Heriberto Leos MD       To Whom It May Concern:    Travis Rodarte was seen and treated in our department on 12/2/2024. She is excused from work for 2 days.    If you have any questions or concerns, please do not hesitate to call.        _____________________________  Physician/APC Signature

## (undated) NOTE — LETTER
?  PREPARTICIPATION PHYSICAL EVALUATION  MEDICAL ELIGIBILITY FORM  [] Medically eligible for all sports without restrictions   [] Medically eligible for all sports without restriction with recommendations for further evaluation or treatment     []Medically eligible for certain sports     [] Not medically eligible pending further evaluation   [] Not medically eligible for any sports    Recommendations:        I have examined the student named on this form and completed the preparticipation physical evaluation. The athlete does not have apparent clinical contraindications to practice and can participate in the sport(s) as outlined on this form. A copy of the physical examination findings are on record in my office and can be made available to the school at the request of the parents. If conditions  arise after the athlete has been cleared for participation, the physician may rescind the medical eligibility until the problem is resolved and the potential consequences are completely explained to the athlete (and parents or guardians).    Name of healthcare professional (print or type: Se Person MD Date: 12/4/2024     Address: [address] [place] [address], [address] Phone: Dept: [phone]      Signature of health care professional:  ***    SHARED EMERGENCY INFORMATION  Allergies: has No Known Allergies.    Medications: Travis has a current medication list which includes the following prescription(s): hydrocodone-acetaminophen, doxycycline, ketorolac tromethamine, and metformin er.     Other Information:      Emergency contacts:   Name Relationship Lgl Grd Work Phone Home Phone Mobile Phone   1. DANIEL CASTILLO Mother    [phone]   2. ESPERANZA CASTILLO Spouse    [phone]         Supplemental COVID?19 questions  1. Have you had any of the following symptoms in the past 14 days?  (Place Check Avel)                a)      Fever or chills Yes  No    b)      Cough Yes  No    c)       Shortness of breath or  difficulty breathing Yes  No    d)      Fatigue Yes  No    e)      Muscle or body aches Yes  No    f)       Headache Yes  No    g)      New loss of taste or smell Yes  No    h)      Sore throat Yes  No    i)       Congestion or runny nose Yes  No    j)       Nausea or vomiting Yes  No    k)      Diarrhea Yes  No    l)       Date symptoms started Yes  No    m)    Date symptoms resolved Yes  No   2. Have you ever had a positive text for COVID-19?   Yes                            No              If yes:        Date of Test ____________      Were you tested because you had symptoms? Yes  No              If yes:        a)       Date symptoms started ____________     b)      Date symptoms resolved  ____________     c)      Were you hospitalized? Yes No    d)      Did you have fever > 100.4 F Yes No                 If yes, how many days did your fever last? ____________     e)      Did you have muscle aches, chills, or lethargy? Yes No    f)       Have you had the vaccine? Yes No        Were you tested because you were exposed to someone with COVID-19, but you did not have any symptoms?  Yes No   3. Has anyone living in your household had any of the following symptoms or tested positive for COVID-19 in the past 14 days? Yes   No                                       If yes, which symptoms [] Fever or chills    []Muscle or body aches   []Nausea or vomiting        [] Sore throat     [] Headache  [] Shortness of breath or difficulty breathing   [] New loss of taste or smell   [] Congestion or runny nose   [] Cough     [] Fatigue     [] Diarrhea   4. Have you been within 6 feet for more than 15 minutes of someone with COVID-19   In the past 14 days? Yes      No                   If yes: date(s) of exposure                  5. Are you currently waiting on results from a recent COVID test?     Yes    No         Sources:  Interim Guidance on the Preparticipation Physical Examinatio... : Clinical Journal of Sport Medicine  (lww.com)  Supplemental COVID?19 Questions (lww.com)  COVID?19 Interim Guidance: Return to Sports and Physical Activity (aap.org)      ?  PREPARTICIPATION PHYSICAL EVALUATION   HISTORY FORM  Note: Complete and sign this form (with your parents if younger than 18) before your appointment.  Name: Travis Rodarte YOB: 2000   Date of Examination: 12/4/2024 Sport(s):    Sex assigned at birth: female How do you identify your gender? female     List past and current medical conditions:  has no past medical history on file.   Have you ever had surgery? If yes, list all past surgical procedures.  has no past surgical history on file.   Medicines and supplements: List all current prescriptions, over-the-counter medicines, and supplements (herbal and nutritional). I am having Travis Rodarte start on HYDROcodone-acetaminophen. I am also having her maintain her metFORMIN ER, doxycycline, and Ketorolac Tromethamine.   Do you have any allergies? If yes, please list all your allergies (ie, medicines, pollens, food, stinging insects). has No Known Allergies.       Patient Health Questionnaire Version 4 (PHQ-4)  Over the last 2 weeks, how often have you been bothered by any of the following problems? ([place] response.)      Not at all Several days Over half the days Nearly  every day   Feeling nervous, anxious, or on edge 0 1 2 3   Not being able to stop or control worrying 0 1 2 3   Little interest or pleasure in doing things 0 1 2 3   Feeling down, depressed, or hopeless 0 1 2 3     (A sum of >=3 is considered positive on either subscale [questions 1 and 2, or questions 3 and 4] for screening purposes.)       GENERAL QUESTIONS  (Explain “Yes” answers at the end of this form.  [place] questions if you don’t know the answer.) Yes No   Do you have any concerns that you would like to discuss with your provider? [] []   Has a provider ever denied or restricted your participation in sports for any reason? [] []   Do you  have any ongoing medical issues or recent illnesses?  [] []   HEART HEALTH QUESTIONS ABOUT YOU Yes No   Have you ever passed out or nearly passed out during or after exercise? [] []   Have you ever had discomfort, pain, tightness, or pressure in your chest during exercise? [] []   Does your heart ever race, flutter in your chest, or skip beats (irregular beats) during exercise? [] []   Has a doctor ever told you that you have any heart problems? [] []   8.     Has a doctor ever requested a test for your heart? For         example, electrocardiography (ECG) or         echocardiography. [] []    HEART HEALTH QUESTIONS ABOUT YOU        (CONTINUED) Yes No   9.  Do you get light -headed or feel shorter of breath      than your friends during exercise? [] []   10.  Have you ever had a seizure? [] []   HEART HEALTH QUESTIONS ABOUT YOUR FAMILY     Yes No   11. Has any family member or relative  of heart           problems or had an unexpected or unexplained        sudden death before age 35 years (including             drowning or unexplained car crash)? [] []   12. Does anyone in your family have a genetic heart           problem  like hypertrophic cardiomyopathy                   (HCM), Marfan syndrome, arrhythmogenic right           ventricular cardiomyopathy (ARVC), long QT               Brugada syndrome, or a catecholaminergic              polymorphic ventricular tachycardia (CPVT)? [] []   13. Has anyone in your family had a pacemaker or      an implanted defibrillation before age 35? [] []                BONE AND JOINT QUESTIONS Yes No   14.   Have you ever had a stress fracture or an injury to a bone, muscle, ligament, joint, or tendon that caused you to miss a practice or game? [] []   15.   Do you have a bone, muscle, ligament, or joint injury that bothers you? [] []   MEDICAL QUESTIONS Yes No   16.   Do you cough, wheeze, or have difficulty breathing during or after exercise? [] []   17.   Are you missing a  kidney, an eye, a testicle (males), your spleen, or any other organ? [] []   18.   Do you have groin or testicle pain or a painful bulge or hernia in the groin area? [] []   19.   Do you have any recurring skin rashes or rashes that come and go, including herpes or methicillin-resistant Staphylococcus aureus (MRSA)? [] []   20.   Have you had a concussion or head injury that caused confusion, a prolonged headache, or memory problems?  []     []       21.   Have you ever had numbness, had tingling, had weakness in your arms or legs, or been unable to move your arms or legs after being hit or falling? [] []   22.   Have you ever become ill while exercising in the heat? [] []   23.   Do you or does someone in your family have sickle cell trait or disease? [] []   24.   Have you ever had or do you have any prob- lems with your eyes or vision? [] []    MEDICAL  QUESTIONS  (CONTINUED  ) Yes No   25.    Do you worry about  your weight? [] []   26. Are you trying to or has anyone recommended that you gain or lose  Weight? [] []   27. Are you on a special diet or do you avoid certain types of foods or food groups? [] []   28.  Have you ever had an eating disorder?                 NO CLEARA [] []   FEMALES ONLY Yes No   29.  Have you ever had a menstrual period? [] []   30. How old were you when you had your first menstrual period?      Explain \"Yes\" answers here.    ______________________________________________________________________________________________________________________________________________________________________________________________________________________________________________________________________________________________________________________________________________________________________________________________________________________________________________________________________________________________________________________________________     I hereby state that, to the best of my  knowledge, my answers to the questions on this form are complete and correct.    Signature of athlete:____________________________________________________________________________________________  Signature of parent or gaurdian:__________________________________________________________________________________     Date: 12/4/2024      ?  PREPARTICIPATION PHYSICAL EVALUATION   PHYSICAL EXAMINATION FORM  Name: Travis Rodarte          YOB: 2000  PHYSICIAN REMINDERS  Consider additional questions on more-sensitive issues.  Do you feel stressed out or under a lot of pressure?  Do you ever feel sad, hopeless, depressed, or anxious?  Do you feel safe at your home or residence?  During the past 30 days, did you use chewing tobacco, snuff, or dip?  Do you drink alcohol or use any other drugs?  Have you ever taken anabolic steroids or used any other performance-enhancing supplement?  Have you ever taken any supplements to help you gain or lose weight or improve your performance?  Do you wear a seat belt, use a helmet, and use condoms?  Consider reviewing questions on cardiovascular symptoms (Q4-Q13 of History Form).    EXAMINATION   Height: 5' 3\" (1.6 m) (12/4/2024  1:54 PM)     Weight: 190 lb (12/4/2024  1:54 PM)     BP: 131/87 (12/2/2024  2:22 PM)     Pulse: 98 (12/2/2024  2:22 PM)   Vision: R 20/      L 20/  Corrected: [] Y []  N   MEDICAL NORMAL ABNORMAL FINDINGS   Appearance  Marfan stigmata (kyphoscoliosis, high-arched palate, pectus excavatum, arachnodactyly, hyperlaxity, myopia, mitral valve prolapse [MVP], and aortic insufficiency)   [x]    []       Eyes, ears, nose, and throat  Pupils equal  Hearing   [x]  []     Lymph nodes   [x]  []   Hearta  Murmurs (auscultation standing, auscultation supine, and ± Valsalva maneuver)   [x]  []   Lungs   [x]  []   Abdomen   [x]  []   Skin  Herpes simplex virus (HSV), lesions suggestive of methicillin-resistant Staphylococcus aureus (MRSA), or tinea corporis   [x]   []   Neurological   [x]  []   MUSCULOSKELETAL NORMAL ABNORMAL FINDINGS   Neck   [x]  []    Back   [x]  []   Shoulder and arm   [x]  []     Elbow and forearm   [x]  []     Wrist, hand, and fingers   [x]  []     Hip and thigh   [x]  []   Knee   [x]  []     Leg and ankle   [x]  []   Foot and toes   [x]  []   Functional  Double-leg squat test, single-leg squat test, and box drop or step drop test   [x]  []   Consider electrocardiography (ECG), echocardiography, referral to a cardiologist for abnormal cardiac history or examination findings, or a combination of those.  Name of healthcare professional (print or type: Se Person MD Date: 12/4/2024     Address: [address] [place] [address], [id number] Phone: Dept: [phone]     Signature:***

## (undated) NOTE — LETTER
12/4/2024          To Whom It May Concern:    Travis Rodarte is currently under my medical care.  She was seen in the emergency room on 12-2-2024 and followed up in our office today 12-4-2024.  She is unable to work at this time.  Please excuse her from any and all work for an additional seven days.  She will be re-evaluated next week and we will provide a release at that time. Activity is restricted as follows: If you require additional information please contact our office.        Sincerely,        Se Person MD          Document generated by:  Daly GIBSON CMA

## (undated) NOTE — MR AVS SNAPSHOT
After Visit Summary   11/9/2023    Lillette Leyden   MRN: [id number]           Visit Information     Date & Time  11/9/2023 11:00 AM Provider  Veronika Becker MD Department  [address], [address], [facility] - OB/GYN Dept. Phone  [phone]      Your Vitals Were  Most recent update: 11/9/2023 11:20 AM    BP   122/74    Pulse   91    Wt   179 lb    LMP   10/06/2023 (Exact Date)          Allergies as of 11/9/2023  Review status set to Review Complete on 11/9/2023   Not on File     Diagnoses for This Visit    Women's annual routine gynecological examination   [[id number]]  -  Primary           We Ordered the Following     Normal Orders This Visit    ThinPrep PAP with HPV Reflex Request B [[id number] CUSTOM]     ThinPrep PAP with HPV Reflex Request [[id number] CUSTOM]                 Did you know that [facility] primary care physicians now offer Video Visits through [employer] for adult patients for a variety of conditions such as allergies, back pain and cold symptoms? Skip the drive and waiting room and online chat with a doctor face-to-face using your web-cam enabled computer or mobile device wherever you are. Video Visits cost $50 and can be paid hassle-free using a credit, debit, or health savings card. Not active on [employer]? Ask us how to get signed up today! If you receive a survey from [employer], please take a few minutes to complete it and provide feedback. We strive to deliver the best patient experience and are looking for ways to make improvements. Your feedback will help us do so. For more information on [facility], please visit [email or web address]           No text in SmartText           No text in SmartText